# Patient Record
Sex: FEMALE | Race: WHITE | NOT HISPANIC OR LATINO | Employment: OTHER | URBAN - METROPOLITAN AREA
[De-identification: names, ages, dates, MRNs, and addresses within clinical notes are randomized per-mention and may not be internally consistent; named-entity substitution may affect disease eponyms.]

---

## 2023-02-20 LAB
ALANINE AMINOTRANSFERASE (SGPT) (U/L) IN SER/PLAS: 13 U/L (ref 7–45)
ANION GAP IN SER/PLAS: 13 MMOL/L (ref 10–20)
CALCIUM (MG/DL) IN SER/PLAS: 9.5 MG/DL (ref 8.6–10.3)
CARBON DIOXIDE, TOTAL (MMOL/L) IN SER/PLAS: 30 MMOL/L (ref 21–32)
CHLORIDE (MMOL/L) IN SER/PLAS: 104 MMOL/L (ref 98–107)
CHOLESTEROL (MG/DL) IN SER/PLAS: 225 MG/DL (ref 0–199)
CHOLESTEROL IN HDL (MG/DL) IN SER/PLAS: 58.6 MG/DL
CHOLESTEROL/HDL RATIO: 3.8
COBALAMIN (VITAMIN B12) (PG/ML) IN SER/PLAS: 164 PG/ML (ref 211–911)
CREATININE (MG/DL) IN SER/PLAS: 0.51 MG/DL (ref 0.5–1.05)
GFR FEMALE: >90 ML/MIN/1.73M2
GLUCOSE (MG/DL) IN SER/PLAS: 90 MG/DL (ref 74–99)
LDL: 149 MG/DL (ref 0–99)
POTASSIUM (MMOL/L) IN SER/PLAS: 4.2 MMOL/L (ref 3.5–5.3)
SODIUM (MMOL/L) IN SER/PLAS: 143 MMOL/L (ref 136–145)
THYROTROPIN (MIU/L) IN SER/PLAS BY DETECTION LIMIT <= 0.05 MIU/L: 1.91 MIU/L (ref 0.44–3.98)
TRIGLYCERIDE (MG/DL) IN SER/PLAS: 89 MG/DL (ref 0–149)
UREA NITROGEN (MG/DL) IN SER/PLAS: 18 MG/DL (ref 6–23)
VLDL: 18 MG/DL (ref 0–40)

## 2023-03-04 PROBLEM — F41.8 SITUATIONAL ANXIETY: Status: ACTIVE | Noted: 2023-03-04

## 2023-03-04 PROBLEM — D69.6 THROMBOCYTOPENIA (CMS-HCC): Status: ACTIVE | Noted: 2023-03-04

## 2023-03-04 PROBLEM — G43.001 MIGRAINE WITHOUT AURA AND WITH STATUS MIGRAINOSUS, NOT INTRACTABLE: Status: ACTIVE | Noted: 2023-03-04

## 2023-03-04 PROBLEM — F40.243 ANXIETY WITH FLYING: Status: ACTIVE | Noted: 2023-03-04

## 2023-03-04 PROBLEM — R22.32 LUMP OF LEFT WRIST: Status: RESOLVED | Noted: 2023-03-04 | Resolved: 2023-03-04

## 2023-03-04 PROBLEM — M25.579 ANKLE PAIN: Status: RESOLVED | Noted: 2023-03-04 | Resolved: 2023-03-04

## 2023-03-04 PROBLEM — H52.203 ASTIGMATISM, BILATERAL: Status: ACTIVE | Noted: 2023-03-04

## 2023-03-04 PROBLEM — H52.4 BILATERAL PRESBYOPIA: Status: ACTIVE | Noted: 2023-03-04

## 2023-03-04 PROBLEM — M75.02 ADHESIVE CAPSULITIS OF LEFT SHOULDER: Status: ACTIVE | Noted: 2023-03-04

## 2023-03-04 PROBLEM — F51.04 CHRONIC INSOMNIA: Status: ACTIVE | Noted: 2023-03-04

## 2023-03-04 PROBLEM — M19.90 INFLAMMATORY ARTHRITIS: Status: RESOLVED | Noted: 2023-03-04 | Resolved: 2023-03-04

## 2023-03-04 PROBLEM — M85.80 OSTEOPENIA: Status: ACTIVE | Noted: 2023-03-04

## 2023-03-04 PROBLEM — M25.532 LEFT WRIST PAIN: Status: ACTIVE | Noted: 2023-03-04
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
## 2023-03-04 PROBLEM — M75.02 ADHESIVE CAPSULITIS OF LEFT SHOULDER: Status: RESOLVED | Noted: 2023-03-04 | Resolved: 2023-03-04

## 2023-03-04 PROBLEM — M84.30XA STRESS FRACTURE: Status: RESOLVED | Noted: 2023-03-04 | Resolved: 2023-03-04

## 2023-03-04 PROBLEM — M79.672 LEFT FOOT PAIN: Status: ACTIVE | Noted: 2023-03-04

## 2023-03-04 PROBLEM — M25.579 ANKLE PAIN: Status: ACTIVE | Noted: 2023-03-04

## 2023-03-04 PROBLEM — R22.32 LUMP OF LEFT WRIST: Status: ACTIVE | Noted: 2023-03-04

## 2023-03-04 PROBLEM — M79.642 PAIN OF LEFT HAND: Status: ACTIVE | Noted: 2023-03-04

## 2023-03-04 PROBLEM — M25.519 SHOULDER PAIN: Status: ACTIVE | Noted: 2023-03-04

## 2023-03-04 PROBLEM — M25.532 LEFT WRIST PAIN: Status: RESOLVED | Noted: 2023-03-04 | Resolved: 2023-03-04

## 2023-03-04 PROBLEM — M84.30XA STRESS FRACTURE: Status: ACTIVE | Noted: 2023-03-04

## 2023-03-04 PROBLEM — M25.519 SHOULDER PAIN: Status: RESOLVED | Noted: 2023-03-04 | Resolved: 2023-03-04

## 2023-03-04 PROBLEM — T75.3XXA MOTION SICKNESS: Status: ACTIVE | Noted: 2023-03-04

## 2023-03-04 PROBLEM — M79.672 LEFT FOOT PAIN: Status: RESOLVED | Noted: 2023-03-04 | Resolved: 2023-03-04

## 2023-03-04 PROBLEM — M19.90 INFLAMMATORY ARTHRITIS: Status: ACTIVE | Noted: 2023-03-04
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
## 2023-03-04 PROBLEM — H54.7 VISION DECREASED: Status: ACTIVE | Noted: 2023-03-04

## 2023-03-04 RX ORDER — ZOLEDRONIC ACID 5 MG/100ML
INJECTION, SOLUTION INTRAVENOUS
COMMUNITY

## 2023-03-04 RX ORDER — PHENOL 1.4 %
AEROSOL, SPRAY (ML) MUCOUS MEMBRANE
COMMUNITY

## 2023-03-07 ENCOUNTER — TELEMEDICINE (OUTPATIENT)
Dept: PRIMARY CARE | Facility: CLINIC | Age: 58
End: 2023-03-07
Payer: COMMERCIAL

## 2023-03-07 DIAGNOSIS — D69.6 THROMBOCYTOPENIA (CMS-HCC): ICD-10-CM

## 2023-03-07 DIAGNOSIS — E78.5 BORDERLINE HYPERLIPIDEMIA: ICD-10-CM

## 2023-03-07 DIAGNOSIS — E53.8 B12 DEFICIENCY: Primary | ICD-10-CM

## 2023-03-07 DIAGNOSIS — F51.04 CHRONIC INSOMNIA: ICD-10-CM

## 2023-03-07 PROCEDURE — 99213 OFFICE O/P EST LOW 20 MIN: CPT | Performed by: INTERNAL MEDICINE

## 2023-03-07 RX ORDER — LANOLIN ALCOHOL/MO/W.PET/CERES
1000 CREAM (GRAM) TOPICAL DAILY
Qty: 90 TABLET | Refills: 2 | Status: SHIPPED | OUTPATIENT
Start: 2023-03-07 | End: 2024-03-08

## 2023-03-08 NOTE — PATIENT INSTRUCTIONS
Please begin B12 as we discussed and remain focused on a low-fat/sugar/carbohydrate diet with exercise.  Please have repeat B12 blood work in 2 months and a repeat cholesterol test in about 6 months.

## 2023-03-08 NOTE — PROGRESS NOTES
Subjective   Patient ID: Nichole Ratliff is a 57 y.o. female who presents for No chief complaint on file..    HPI follow-up to review lab works.  Discussed labs with patient.  Remains on a largely vegetarian/vegan diet.  Has been on for greater than 20 years.  Takes no supplements.  Overall eats well with low-fat/sugar/carbs.  Exercises daily.  No family history of premature coronary artery disease.  Insomnia relatively stable.    Review of Systems all systems reviewed and negative except as per history of present illness    Objective   There were no vitals taken for this visit.    Physical Exam         Lab Results   Component Value Date    WBC 4.2 (L) 04/17/2021    HGB 13.9 04/17/2021    HCT 42.3 04/17/2021     04/17/2021    CHOL 225 (H) 02/20/2023    TRIG 89 02/20/2023    HDL 58.6 02/20/2023    ALT 13 02/20/2023    AST 17 04/17/2021     02/20/2023    K 4.2 02/20/2023     02/20/2023    CREATININE 0.51 02/20/2023    BUN 18 02/20/2023    CO2 30 02/20/2023    TSH 1.91 02/20/2023     Lab Results   Component Value Date    [id number] 164 (L) 02/20/2023         Assessment/Plan     #1 B12 deficiency-likely large part due to prolonged vegetarian diet.  Reviewed options of IM B12 versus oral.  We will try oral B12 1000 mcg daily and retest in 2 months.  Orders and labs.  Reviewed  #2 hyperlipidemia-modest.  Low risk for atherosclerotic cardiovascular disease.  No family history.  Continue to focus on diet and exercise.  We will recheck lipids in 6 months.  Consider coronary artery calcium scan.  #3 chronic insomnia-stable.  #4 thrombocytopenia-platelets stable.

## 2023-12-19 ENCOUNTER — ANCILLARY PROCEDURE (OUTPATIENT)
Dept: RADIOLOGY | Facility: CLINIC | Age: 58
End: 2023-12-19
Payer: COMMERCIAL

## 2023-12-19 DIAGNOSIS — Z78.0 ASYMPTOMATIC MENOPAUSAL STATE: ICD-10-CM

## 2023-12-19 PROCEDURE — 77080 DXA BONE DENSITY AXIAL: CPT | Performed by: RADIOLOGY

## 2023-12-19 PROCEDURE — 77080 DXA BONE DENSITY AXIAL: CPT

## 2024-01-02 DIAGNOSIS — Z12.11 COLON CANCER SCREENING: ICD-10-CM

## 2024-01-02 RX ORDER — SODIUM PICOSULFATE, MAGNESIUM OXIDE, AND ANHYDROUS CITRIC ACID 12; 3.5; 1 G/175ML; G/175ML; MG/175ML
1 LIQUID ORAL SEE ADMIN INSTRUCTIONS
Qty: 175 ML | Refills: 0 | Status: SHIPPED | OUTPATIENT
Start: 2024-01-02 | End: 2024-03-04 | Stop reason: WASHOUT

## 2024-01-19 DIAGNOSIS — Z12.11 SPECIAL SCREENING FOR MALIGNANT NEOPLASMS, COLON: ICD-10-CM

## 2024-01-19 RX ORDER — SOD SULF/POT CHLORIDE/MAG SULF 1.479 G
TABLET ORAL
Qty: 24 TABLET | Refills: 0 | Status: SHIPPED | OUTPATIENT
Start: 2024-01-19 | End: 2024-03-04 | Stop reason: WASHOUT

## 2024-02-06 DIAGNOSIS — Z12.11 SCREEN FOR COLON CANCER: Primary | ICD-10-CM

## 2024-02-06 RX ORDER — SOD SULF/POT CHLORIDE/MAG SULF 1.479 G
12 TABLET ORAL DAILY
Qty: 24 TABLET | Refills: 0 | Status: SHIPPED | OUTPATIENT
Start: 2024-02-06 | End: 2024-02-08

## 2024-02-09 ENCOUNTER — OFFICE VISIT (OUTPATIENT)
Dept: GASTROENTEROLOGY | Facility: EXTERNAL LOCATION | Age: 59
End: 2024-02-09
Payer: COMMERCIAL

## 2024-02-09 ENCOUNTER — LAB REQUISITION (OUTPATIENT)
Dept: LAB | Facility: HOSPITAL | Age: 59
End: 2024-02-09
Payer: COMMERCIAL

## 2024-02-09 DIAGNOSIS — Z12.11 ENCOUNTER FOR SCREENING FOR MALIGNANT NEOPLASM OF COLON: ICD-10-CM

## 2024-02-09 DIAGNOSIS — D12.4 BENIGN NEOPLASM OF DESCENDING COLON: Primary | ICD-10-CM

## 2024-02-09 DIAGNOSIS — C18.1 MALIGNANT NEOPLASM OF APPENDIX VERMIFORMIS (MULTI): ICD-10-CM

## 2024-02-09 PROCEDURE — 88305 TISSUE EXAM BY PATHOLOGIST: CPT | Performed by: STUDENT IN AN ORGANIZED HEALTH CARE EDUCATION/TRAINING PROGRAM

## 2024-02-09 PROCEDURE — 1036F TOBACCO NON-USER: CPT | Performed by: INTERNAL MEDICINE

## 2024-02-09 PROCEDURE — 88305 TISSUE EXAM BY PATHOLOGIST: CPT

## 2024-02-09 PROCEDURE — 45385 COLONOSCOPY W/LESION REMOVAL: CPT | Performed by: INTERNAL MEDICINE

## 2024-02-14 LAB
LABORATORY COMMENT REPORT: NORMAL
PATH REPORT.FINAL DX SPEC: NORMAL
PATH REPORT.GROSS SPEC: NORMAL
PATH REPORT.RELEVANT HX SPEC: NORMAL
PATH REPORT.TOTAL CANCER: NORMAL

## 2024-02-28 ENCOUNTER — APPOINTMENT (OUTPATIENT)
Dept: PRIMARY CARE | Facility: CLINIC | Age: 59
End: 2024-02-28
Payer: COMMERCIAL

## 2024-03-04 ENCOUNTER — OFFICE VISIT (OUTPATIENT)
Dept: PRIMARY CARE | Facility: CLINIC | Age: 59
End: 2024-03-04
Payer: COMMERCIAL

## 2024-03-04 ENCOUNTER — LAB (OUTPATIENT)
Dept: LAB | Facility: LAB | Age: 59
End: 2024-03-04
Payer: COMMERCIAL

## 2024-03-04 VITALS
HEIGHT: 64 IN | DIASTOLIC BLOOD PRESSURE: 70 MMHG | TEMPERATURE: 97.8 F | BODY MASS INDEX: 20.62 KG/M2 | WEIGHT: 120.8 LBS | SYSTOLIC BLOOD PRESSURE: 104 MMHG

## 2024-03-04 DIAGNOSIS — E53.8 B12 DEFICIENCY: ICD-10-CM

## 2024-03-04 DIAGNOSIS — F40.243 PHOBIA, FLYING: ICD-10-CM

## 2024-03-04 DIAGNOSIS — Z00.00 WELL ADULT EXAM: Primary | ICD-10-CM

## 2024-03-04 DIAGNOSIS — Z00.00 WELL ADULT EXAM: ICD-10-CM

## 2024-03-04 LAB
ALT SERPL W P-5'-P-CCNC: 12 U/L (ref 7–45)
ANION GAP SERPL CALC-SCNC: 11 MMOL/L (ref 10–20)
BUN SERPL-MCNC: 12 MG/DL (ref 6–23)
CALCIUM SERPL-MCNC: 9.4 MG/DL (ref 8.6–10.3)
CHLORIDE SERPL-SCNC: 107 MMOL/L (ref 98–107)
CHOLEST SERPL-MCNC: 220 MG/DL (ref 0–199)
CHOLESTEROL/HDL RATIO: 3.5
CO2 SERPL-SCNC: 26 MMOL/L (ref 21–32)
CREAT SERPL-MCNC: 0.5 MG/DL (ref 0.5–1.05)
EGFRCR SERPLBLD CKD-EPI 2021: >90 ML/MIN/1.73M*2
ERYTHROCYTE [DISTWIDTH] IN BLOOD BY AUTOMATED COUNT: 12.6 % (ref 11.5–14.5)
FOLATE SERPL-MCNC: 17.8 NG/ML
GLUCOSE SERPL-MCNC: 90 MG/DL (ref 74–99)
HCT VFR BLD AUTO: 41.5 % (ref 36–46)
HDLC SERPL-MCNC: 62.3 MG/DL
HGB BLD-MCNC: 13.6 G/DL (ref 12–16)
LDLC SERPL CALC-MCNC: 137 MG/DL
MCH RBC QN AUTO: 30 PG (ref 26–34)
MCHC RBC AUTO-ENTMCNC: 32.8 G/DL (ref 32–36)
MCV RBC AUTO: 92 FL (ref 80–100)
NON HDL CHOLESTEROL: 158 MG/DL (ref 0–149)
NRBC BLD-RTO: 0 /100 WBCS (ref 0–0)
PLATELET # BLD AUTO: 163 X10*3/UL (ref 150–450)
POTASSIUM SERPL-SCNC: 4 MMOL/L (ref 3.5–5.3)
RBC # BLD AUTO: 4.53 X10*6/UL (ref 4–5.2)
SODIUM SERPL-SCNC: 140 MMOL/L (ref 136–145)
TRIGL SERPL-MCNC: 104 MG/DL (ref 0–149)
TSH SERPL-ACNC: 1.03 MIU/L (ref 0.44–3.98)
VIT B12 SERPL-MCNC: 393 PG/ML (ref 211–911)
VLDL: 21 MG/DL (ref 0–40)
WBC # BLD AUTO: 4.9 X10*3/UL (ref 4.4–11.3)

## 2024-03-04 PROCEDURE — 36415 COLL VENOUS BLD VENIPUNCTURE: CPT

## 2024-03-04 PROCEDURE — 85027 COMPLETE CBC AUTOMATED: CPT

## 2024-03-04 PROCEDURE — 80048 BASIC METABOLIC PNL TOTAL CA: CPT

## 2024-03-04 PROCEDURE — 84460 ALANINE AMINO (ALT) (SGPT): CPT

## 2024-03-04 PROCEDURE — 83036 HEMOGLOBIN GLYCOSYLATED A1C: CPT

## 2024-03-04 PROCEDURE — 82607 VITAMIN B-12: CPT

## 2024-03-04 PROCEDURE — 84443 ASSAY THYROID STIM HORMONE: CPT

## 2024-03-04 PROCEDURE — 99396 PREV VISIT EST AGE 40-64: CPT | Performed by: INTERNAL MEDICINE

## 2024-03-04 PROCEDURE — 1036F TOBACCO NON-USER: CPT | Performed by: INTERNAL MEDICINE

## 2024-03-04 PROCEDURE — 80061 LIPID PANEL: CPT

## 2024-03-04 PROCEDURE — 82746 ASSAY OF FOLIC ACID SERUM: CPT

## 2024-03-04 ASSESSMENT — PATIENT HEALTH QUESTIONNAIRE - PHQ9
2. FEELING DOWN, DEPRESSED OR HOPELESS: NOT AT ALL
SUM OF ALL RESPONSES TO PHQ9 QUESTIONS 1 AND 2: 0
1. LITTLE INTEREST OR PLEASURE IN DOING THINGS: NOT AT ALL

## 2024-03-04 NOTE — PROGRESS NOTES
"SUBJECTIVE:   Nichole Ratliff is a 58 y.o. female presenting for her annual checkup.  Current Outpatient Medications   Medication Sig Dispense Refill    cyanocobalamin (Vitamin B-12) 1,000 mcg tablet Take 1 tablet (1,000 mcg) by mouth once daily. 90 tablet 2    melatonin-lemon balm leaf extr 10-1 mg tablet Take by mouth.      zoledronic acid (Reclast) 5 mg/100 mL piggyback Infuse into a venous catheter.      sod picosulf-mag ox-citric ac (Clenpiq) 10 mg-3.5 gram- 12 gram/175 mL solution Take 1 Box by mouth see administration instructions. Starting at 6pm night before procedure drink 1 bottle as per instructions, then 5 hours before procedure time drink 2nd as instructed (Patient not taking: Reported on 3/4/2024) 175 mL 0    sod sulf-pot chloride-mag sulf (Sutab) 1.479-0.188- 0.225 gram tablet Starting at 6pm open one bottle of pills and fill glass provided with water and drink according to prep sheet. Start the 2nd bottle with directions on prep sheet 5 hours before procedure time (Patient not taking: Reported on 3/4/2024) 24 tablet 0     No current facility-administered medications for this visit.     Allergies: Clindamycin, Meloxicam, and Other     ROS:  Feeling well. No dyspnea or chest pain on exertion. No abdominal pain, change in bowel habits, black or bloody stools. No urinary tract or prostatic symptoms. No neurological complaints.    OBJECTIVE:   The patient appears well, alert, oriented x 3, in no distress.   /70   Temp 36.6 °C (97.8 °F)   Ht 1.613 m (5' 3.5\")   Wt 54.8 kg (120 lb 12.8 oz)   BMI 21.06 kg/m²   ENT normal.  Neck supple. No adenopathy or thyromegaly. YEN. Lungs are clear, good air entry, no wheezes, rhonchi or rales. S1 and S2 normal, no murmurs, regular rate and rhythm. Abdomen is soft without tenderness, guarding, mass or organomegaly.  Extremities show no edema, normal peripheral pulses. Neurological is normal without focal findings.    ASSESSMENT:   healthy adult female    PLAN: "   continue current healthy lifestyle patterns and return for routine annual checkups    #1 B12 deficiency-likely large part due to prolonged vegetarian diet.  Reviewed options of IM B12 versus oral.  We will try oral B12 1000 mcg daily and retest in 2 months.  Orders and labs.  Reviewed  #2 hyperlipidemia-modest.  Low risk for atherosclerotic cardiovascular disease.  No family history.  Continue to focus on diet and exercise.  We will recheck lipids in 6 months.  Consider coronary artery calcium scan.  #3 chronic insomnia-stable.  #4 thrombocytopenia-platelets stable.    S/p Shingrix 2/2

## 2024-03-05 LAB
EST. AVERAGE GLUCOSE BLD GHB EST-MCNC: 105 MG/DL
HBA1C MFR BLD: 5.3 %

## 2024-03-06 DIAGNOSIS — E53.8 B12 DEFICIENCY: ICD-10-CM

## 2024-03-08 RX ORDER — LANOLIN ALCOHOL/MO/W.PET/CERES
1000 CREAM (GRAM) TOPICAL DAILY
Qty: 90 TABLET | Refills: 2 | Status: SHIPPED | OUTPATIENT
Start: 2024-03-08 | End: 2025-03-08

## 2024-03-12 ENCOUNTER — TELEPHONE (OUTPATIENT)
Dept: PRIMARY CARE | Facility: CLINIC | Age: 59
End: 2024-03-12
Payer: COMMERCIAL

## 2024-03-12 DIAGNOSIS — F40.243 ANXIETY WITH FLYING: ICD-10-CM

## 2024-03-12 DIAGNOSIS — F40.243 PHOBIA, FLYING: Primary | ICD-10-CM

## 2024-03-12 RX ORDER — DIAZEPAM 5 MG/1
TABLET ORAL
Qty: 10 TABLET | Refills: 0 | Status: SHIPPED | OUTPATIENT
Start: 2024-03-12

## 2024-03-12 RX ORDER — DIAZEPAM 10 MG/1
TABLET ORAL
Qty: 4 TABLET | Refills: 0 | Status: SHIPPED | OUTPATIENT
Start: 2024-03-12 | End: 2024-03-12 | Stop reason: DRUGHIGH

## 2024-03-12 NOTE — TELEPHONE ENCOUNTER
PT called stating the incorrect dosage for diazepam was sent in.  She is asking for #10  5 mg tabs.  Please advise patient once this has been corrected.

## 2024-03-12 NOTE — TELEPHONE ENCOUNTER
PT in last week, was told Rx for Diazepam was being sent to pharmacy and has not been sent yet.  She is flying soon and needs to get filled ASAP.    Please be sure to contact patient when this has been sent to pharmacy.

## 2024-03-26 ENCOUNTER — TELEPHONE (OUTPATIENT)
Dept: PRIMARY CARE | Facility: CLINIC | Age: 59
End: 2024-03-26
Payer: COMMERCIAL

## 2024-03-26 NOTE — TELEPHONE ENCOUNTER
Pt has puss filled bumps on torso that are painful.  She has apt tomorrow. Wants to be seen today, can we add today at end of day?

## 2024-03-27 ENCOUNTER — OFFICE VISIT (OUTPATIENT)
Dept: PRIMARY CARE | Facility: CLINIC | Age: 59
End: 2024-03-27
Payer: COMMERCIAL

## 2024-03-27 VITALS — BODY MASS INDEX: 21.2 KG/M2 | DIASTOLIC BLOOD PRESSURE: 70 MMHG | WEIGHT: 121.6 LBS | SYSTOLIC BLOOD PRESSURE: 108 MMHG

## 2024-03-27 DIAGNOSIS — L73.8 HOT TUB FOLLICULITIS: Primary | ICD-10-CM

## 2024-03-27 PROCEDURE — 99213 OFFICE O/P EST LOW 20 MIN: CPT | Performed by: INTERNAL MEDICINE

## 2024-03-27 PROCEDURE — 1036F TOBACCO NON-USER: CPT | Performed by: INTERNAL MEDICINE

## 2024-03-27 ASSESSMENT — PATIENT HEALTH QUESTIONNAIRE - PHQ9
SUM OF ALL RESPONSES TO PHQ9 QUESTIONS 1 AND 2: 0
2. FEELING DOWN, DEPRESSED OR HOPELESS: NOT AT ALL
1. LITTLE INTEREST OR PLEASURE IN DOING THINGS: NOT AT ALL

## 2024-03-28 ENCOUNTER — APPOINTMENT (OUTPATIENT)
Dept: PRIMARY CARE | Facility: CLINIC | Age: 59
End: 2024-03-28
Payer: COMMERCIAL

## 2024-03-28 NOTE — PROGRESS NOTES
Subjective   Patient ID: Nichole Ratliff is a 58 y.o. female who presents for bumps all over her body x 5 days.    HPI     Noted red bumps on torso.  Traveling.  No one else in the family suffered.  Burning with mild itching.  Gradually improving.  Had facility she was staying at inspected, no bedbugs found.  No one else in the family has the issue.  No other clear exposures.  However, did spend significant time in a hot tub.  Review of Systems  All systems reviewed and negative except as per history of present illness  Objective   /70   Wt 55.2 kg (121 lb 9.6 oz)   BMI 21.20 kg/m²     Physical Exam    Assessment/Plan     Appears to be resolving hot tub folliculitis.  Reviewed with patient.  Discussed differential diagnosis.  Patient will follow-up if symptoms do not continue to resolve.  Patient will go to the ED any progressive symptoms.  Reviewed precautions in the unlikely event this is bedbugs.

## 2024-04-02 ENCOUNTER — APPOINTMENT (OUTPATIENT)
Dept: PRIMARY CARE | Facility: CLINIC | Age: 59
End: 2024-04-02
Payer: COMMERCIAL

## 2025-08-19 ENCOUNTER — TELEPHONE (OUTPATIENT)
Dept: PRIMARY CARE | Facility: CLINIC | Age: 60
End: 2025-08-19
Payer: COMMERCIAL

## 2025-08-20 DIAGNOSIS — U07.1 COVID: Primary | ICD-10-CM

## 2025-08-20 RX ORDER — BENZONATATE 100 MG/1
100 CAPSULE ORAL 3 TIMES DAILY PRN
Qty: 42 CAPSULE | Refills: 0 | Status: SHIPPED | OUTPATIENT
Start: 2025-08-20 | End: 2025-09-19